# Patient Record
Sex: FEMALE | Race: WHITE | NOT HISPANIC OR LATINO | Employment: UNEMPLOYED | URBAN - METROPOLITAN AREA
[De-identification: names, ages, dates, MRNs, and addresses within clinical notes are randomized per-mention and may not be internally consistent; named-entity substitution may affect disease eponyms.]

---

## 2024-07-10 ENCOUNTER — TRANSCRIBE ORDERS (OUTPATIENT)
Dept: LAB | Facility: HOSPITAL | Age: 29
End: 2024-07-10
Payer: COMMERCIAL

## 2024-07-10 ENCOUNTER — LAB (OUTPATIENT)
Dept: LAB | Facility: HOSPITAL | Age: 29
End: 2024-07-10
Payer: COMMERCIAL

## 2024-07-10 DIAGNOSIS — Z34.82 PRENATAL CARE, SUBSEQUENT PREGNANCY, SECOND TRIMESTER: Primary | ICD-10-CM

## 2024-07-10 DIAGNOSIS — Z34.82 PRENATAL CARE, SUBSEQUENT PREGNANCY, SECOND TRIMESTER: ICD-10-CM

## 2024-07-10 LAB
AMPHET+METHAMPHET UR QL: NEGATIVE
AMPHETAMINES UR QL: NEGATIVE
BACTERIA UR QL AUTO: ABNORMAL /HPF
BARBITURATES UR QL SCN: NEGATIVE
BENZODIAZ UR QL SCN: NEGATIVE
BILIRUB UR QL STRIP: NEGATIVE
BUPRENORPHINE SERPL-MCNC: NEGATIVE NG/ML
CANNABINOIDS SERPL QL: NEGATIVE
CLARITY UR: ABNORMAL
COCAINE UR QL: NEGATIVE
COLOR UR: YELLOW
DEPRECATED RDW RBC AUTO: 45.2 FL (ref 37–54)
ERYTHROCYTE [DISTWIDTH] IN BLOOD BY AUTOMATED COUNT: 14.1 % (ref 12.3–15.4)
FENTANYL UR-MCNC: NEGATIVE NG/ML
GLUCOSE UR STRIP-MCNC: NEGATIVE MG/DL
HBV SURFACE AG SERPL QL IA: NORMAL
HCT VFR BLD AUTO: 33.5 % (ref 34–46.6)
HCV AB SER QL: NORMAL
HGB BLD-MCNC: 11.2 G/DL (ref 12–15.9)
HGB UR QL STRIP.AUTO: NEGATIVE
HIV 1+2 AB+HIV1 P24 AG SERPL QL IA: NORMAL
HOLD SPECIMEN: NORMAL
HYALINE CASTS UR QL AUTO: ABNORMAL /LPF
KETONES UR QL STRIP: ABNORMAL
LEUKOCYTE ESTERASE UR QL STRIP.AUTO: ABNORMAL
MCH RBC QN AUTO: 29.4 PG (ref 26.6–33)
MCHC RBC AUTO-ENTMCNC: 33.4 G/DL (ref 31.5–35.7)
MCV RBC AUTO: 87.9 FL (ref 79–97)
METHADONE UR QL SCN: NEGATIVE
NITRITE UR QL STRIP: NEGATIVE
OPIATES UR QL: NEGATIVE
OXYCODONE UR QL SCN: NEGATIVE
PCP UR QL SCN: NEGATIVE
PH UR STRIP.AUTO: 6 [PH] (ref 5–8)
PLATELET # BLD AUTO: 171 10*3/MM3 (ref 140–450)
PMV BLD AUTO: 10.8 FL (ref 6–12)
PROT UR QL STRIP: ABNORMAL
RBC # BLD AUTO: 3.81 10*6/MM3 (ref 3.77–5.28)
RBC # UR STRIP: ABNORMAL /HPF
REF LAB TEST METHOD: ABNORMAL
SP GR UR STRIP: 1.03 (ref 1–1.03)
SQUAMOUS #/AREA URNS HPF: ABNORMAL /HPF
TREPONEMA PALLIDUM IGG+IGM AB [PRESENCE] IN SERUM OR PLASMA BY IMMUNOASSAY: NORMAL
TRICYCLICS UR QL SCN: NEGATIVE
UROBILINOGEN UR QL STRIP: ABNORMAL
WBC # UR STRIP: ABNORMAL /HPF
WBC NRBC COR # BLD AUTO: 4.64 10*3/MM3 (ref 3.4–10.8)

## 2024-07-10 PROCEDURE — 86900 BLOOD TYPING SEROLOGIC ABO: CPT

## 2024-07-10 PROCEDURE — G0432 EIA HIV-1/HIV-2 SCREEN: HCPCS

## 2024-07-10 PROCEDURE — 36415 COLL VENOUS BLD VENIPUNCTURE: CPT

## 2024-07-10 PROCEDURE — 87086 URINE CULTURE/COLONY COUNT: CPT | Performed by: STUDENT IN AN ORGANIZED HEALTH CARE EDUCATION/TRAINING PROGRAM

## 2024-07-10 PROCEDURE — 86850 RBC ANTIBODY SCREEN: CPT

## 2024-07-10 PROCEDURE — 87340 HEPATITIS B SURFACE AG IA: CPT

## 2024-07-10 PROCEDURE — 85027 COMPLETE CBC AUTOMATED: CPT | Performed by: STUDENT IN AN ORGANIZED HEALTH CARE EDUCATION/TRAINING PROGRAM

## 2024-07-10 PROCEDURE — 81001 URINALYSIS AUTO W/SCOPE: CPT

## 2024-07-10 PROCEDURE — 87491 CHLMYD TRACH DNA AMP PROBE: CPT

## 2024-07-10 PROCEDURE — 80307 DRUG TEST PRSMV CHEM ANLYZR: CPT

## 2024-07-10 PROCEDURE — 86762 RUBELLA ANTIBODY: CPT

## 2024-07-10 PROCEDURE — 86787 VARICELLA-ZOSTER ANTIBODY: CPT

## 2024-07-10 PROCEDURE — 87591 N.GONORRHOEAE DNA AMP PROB: CPT

## 2024-07-10 PROCEDURE — 86901 BLOOD TYPING SEROLOGIC RH(D): CPT

## 2024-07-10 PROCEDURE — 86803 HEPATITIS C AB TEST: CPT

## 2024-07-10 PROCEDURE — 86780 TREPONEMA PALLIDUM: CPT

## 2024-07-10 PROCEDURE — 86870 RBC ANTIBODY IDENTIFICATION: CPT

## 2024-07-11 LAB
ABO GROUP BLD: NORMAL
BACTERIA SPEC AEROBE CULT: NORMAL
BLD GP AB SCN SERPL QL: POSITIVE
RESIDUAL RHIG DETECTED: NORMAL
RH BLD: NEGATIVE
RUBV IGG SERPL IA-ACNC: <0.9 INDEX
VZV IGG SER IA-ACNC: 790 INDEX

## 2024-07-14 LAB
C TRACH RRNA SPEC QL NAA+PROBE: NEGATIVE
N GONORRHOEA RRNA SPEC QL NAA+PROBE: NEGATIVE

## 2024-08-14 ENCOUNTER — TRANSCRIBE ORDERS (OUTPATIENT)
Dept: LAB | Facility: HOSPITAL | Age: 29
End: 2024-08-14
Payer: COMMERCIAL

## 2024-08-14 ENCOUNTER — LAB (OUTPATIENT)
Dept: LAB | Facility: HOSPITAL | Age: 29
End: 2024-08-14
Payer: COMMERCIAL

## 2024-08-14 DIAGNOSIS — Z3A.21 21 WEEKS GESTATION OF PREGNANCY: ICD-10-CM

## 2024-08-14 DIAGNOSIS — M54.50 LOW BACK PAIN, UNSPECIFIED BACK PAIN LATERALITY, UNSPECIFIED CHRONICITY, UNSPECIFIED WHETHER SCIATICA PRESENT: ICD-10-CM

## 2024-08-14 DIAGNOSIS — Z3A.21 21 WEEKS GESTATION OF PREGNANCY: Primary | ICD-10-CM

## 2024-08-14 LAB
BILIRUB UR QL STRIP: NEGATIVE
CLARITY UR: ABNORMAL
COLOR UR: YELLOW
GLUCOSE UR STRIP-MCNC: NEGATIVE MG/DL
HGB UR QL STRIP.AUTO: NEGATIVE
HOLD SPECIMEN: NORMAL
KETONES UR QL STRIP: NEGATIVE
LEUKOCYTE ESTERASE UR QL STRIP.AUTO: ABNORMAL
NITRITE UR QL STRIP: NEGATIVE
PH UR STRIP.AUTO: 6 [PH] (ref 5–8)
PROT UR QL STRIP: ABNORMAL
SP GR UR STRIP: 1.02 (ref 1–1.03)
UROBILINOGEN UR QL STRIP: ABNORMAL

## 2024-08-14 PROCEDURE — 81001 URINALYSIS AUTO W/SCOPE: CPT

## 2024-08-14 PROCEDURE — 87186 SC STD MICRODIL/AGAR DIL: CPT

## 2024-08-14 PROCEDURE — 87077 CULTURE AEROBIC IDENTIFY: CPT

## 2024-08-14 PROCEDURE — 87086 URINE CULTURE/COLONY COUNT: CPT

## 2024-08-15 LAB
BACTERIA UR QL AUTO: ABNORMAL /HPF
HYALINE CASTS UR QL AUTO: ABNORMAL /LPF
RBC # UR STRIP: ABNORMAL /HPF
REF LAB TEST METHOD: ABNORMAL
SQUAMOUS #/AREA URNS HPF: ABNORMAL /HPF
WBC # UR STRIP: ABNORMAL /HPF

## 2024-08-17 LAB — BACTERIA SPEC AEROBE CULT: ABNORMAL

## 2024-10-08 ENCOUNTER — TRANSCRIBE ORDERS (OUTPATIENT)
Dept: LAB | Facility: HOSPITAL | Age: 29
End: 2024-10-08
Payer: COMMERCIAL

## 2024-10-08 ENCOUNTER — LAB (OUTPATIENT)
Dept: LAB | Facility: HOSPITAL | Age: 29
End: 2024-10-08
Payer: COMMERCIAL

## 2024-10-08 DIAGNOSIS — Z34.83 PRENATAL CARE, SUBSEQUENT PREGNANCY, THIRD TRIMESTER: Primary | ICD-10-CM

## 2024-10-08 DIAGNOSIS — Z34.83 PRENATAL CARE, SUBSEQUENT PREGNANCY, THIRD TRIMESTER: ICD-10-CM

## 2024-10-08 LAB
BLD GP AB SCN SERPL QL: NEGATIVE
GLUCOSE 1H P 100 G GLC PO SERPL-MCNC: 86 MG/DL (ref 65–139)
TREPONEMA PALLIDUM IGG+IGM AB [PRESENCE] IN SERUM OR PLASMA BY IMMUNOASSAY: NORMAL

## 2024-10-08 PROCEDURE — 85027 COMPLETE CBC AUTOMATED: CPT

## 2024-10-08 PROCEDURE — 82950 GLUCOSE TEST: CPT

## 2024-10-08 PROCEDURE — 86850 RBC ANTIBODY SCREEN: CPT

## 2024-10-08 PROCEDURE — 86780 TREPONEMA PALLIDUM: CPT

## 2024-10-08 PROCEDURE — 82948 REAGENT STRIP/BLOOD GLUCOSE: CPT | Performed by: OBSTETRICS & GYNECOLOGY

## 2024-10-08 PROCEDURE — 36415 COLL VENOUS BLD VENIPUNCTURE: CPT

## 2024-10-09 LAB
DEPRECATED RDW RBC AUTO: 44.2 FL (ref 37–54)
ERYTHROCYTE [DISTWIDTH] IN BLOOD BY AUTOMATED COUNT: 13.1 % (ref 12.3–15.4)
HCT VFR BLD AUTO: 33.7 % (ref 34–46.6)
HGB BLD-MCNC: 10.8 G/DL (ref 12–15.9)
MCH RBC QN AUTO: 29.6 PG (ref 26.6–33)
MCHC RBC AUTO-ENTMCNC: 32 G/DL (ref 31.5–35.7)
MCV RBC AUTO: 92.3 FL (ref 79–97)
PLATELET # BLD AUTO: 145 10*3/MM3 (ref 140–450)
PMV BLD AUTO: 10.8 FL (ref 6–12)
RBC # BLD AUTO: 3.65 10*6/MM3 (ref 3.77–5.28)
WBC NRBC COR # BLD AUTO: 6.01 10*3/MM3 (ref 3.4–10.8)

## 2024-11-26 LAB — EXTERNAL GROUP B STREP ANTIGEN: NEGATIVE

## 2024-12-16 ENCOUNTER — PREP FOR SURGERY (OUTPATIENT)
Dept: OTHER | Facility: HOSPITAL | Age: 29
End: 2024-12-16
Payer: COMMERCIAL

## 2024-12-16 DIAGNOSIS — Z3A.39 39 WEEKS GESTATION OF PREGNANCY: Primary | ICD-10-CM

## 2024-12-16 RX ORDER — CARBOPROST TROMETHAMINE 250 UG/ML
250 INJECTION, SOLUTION INTRAMUSCULAR AS NEEDED
Status: CANCELLED | OUTPATIENT
Start: 2024-12-16

## 2024-12-16 RX ORDER — LIDOCAINE HYDROCHLORIDE 10 MG/ML
0.5 INJECTION, SOLUTION EPIDURAL; INFILTRATION; INTRACAUDAL; PERINEURAL ONCE AS NEEDED
Status: CANCELLED | OUTPATIENT
Start: 2024-12-16

## 2024-12-16 RX ORDER — MAGNESIUM CARB/ALUMINUM HYDROX 105-160MG
30 TABLET,CHEWABLE ORAL ONCE
Status: CANCELLED | OUTPATIENT
Start: 2024-12-16 | End: 2024-12-16

## 2024-12-16 RX ORDER — TERBUTALINE SULFATE 1 MG/ML
0.25 INJECTION, SOLUTION SUBCUTANEOUS AS NEEDED
Status: CANCELLED | OUTPATIENT
Start: 2024-12-16

## 2024-12-16 RX ORDER — ACETAMINOPHEN 325 MG/1
650 TABLET ORAL EVERY 4 HOURS PRN
Status: CANCELLED | OUTPATIENT
Start: 2024-12-16

## 2024-12-16 RX ORDER — ONDANSETRON 4 MG/1
4 TABLET, ORALLY DISINTEGRATING ORAL EVERY 6 HOURS PRN
Status: CANCELLED | OUTPATIENT
Start: 2024-12-16

## 2024-12-16 RX ORDER — BUTORPHANOL TARTRATE 2 MG/ML
2 INJECTION, SOLUTION INTRAMUSCULAR; INTRAVENOUS
Status: CANCELLED | OUTPATIENT
Start: 2024-12-16

## 2024-12-16 RX ORDER — SODIUM CHLORIDE, SODIUM LACTATE, POTASSIUM CHLORIDE, CALCIUM CHLORIDE 600; 310; 30; 20 MG/100ML; MG/100ML; MG/100ML; MG/100ML
125 INJECTION, SOLUTION INTRAVENOUS CONTINUOUS
Status: CANCELLED | OUTPATIENT
Start: 2024-12-16 | End: 2024-12-17

## 2024-12-16 RX ORDER — SODIUM CHLORIDE 0.9 % (FLUSH) 0.9 %
10 SYRINGE (ML) INJECTION EVERY 12 HOURS SCHEDULED
Status: CANCELLED | OUTPATIENT
Start: 2024-12-16

## 2024-12-16 RX ORDER — SODIUM CHLORIDE 9 MG/ML
40 INJECTION, SOLUTION INTRAVENOUS AS NEEDED
Status: CANCELLED | OUTPATIENT
Start: 2024-12-16

## 2024-12-16 RX ORDER — OXYTOCIN/0.9 % SODIUM CHLORIDE 30/500 ML
2-20 PLASTIC BAG, INJECTION (ML) INTRAVENOUS
Status: CANCELLED | OUTPATIENT
Start: 2024-12-16

## 2024-12-16 RX ORDER — BUTORPHANOL TARTRATE 1 MG/ML
1 INJECTION, SOLUTION INTRAMUSCULAR; INTRAVENOUS
Status: CANCELLED | OUTPATIENT
Start: 2024-12-16

## 2024-12-16 RX ORDER — METHYLERGONOVINE MALEATE 0.2 MG/ML
200 INJECTION INTRAVENOUS ONCE AS NEEDED
Status: CANCELLED | OUTPATIENT
Start: 2024-12-16

## 2024-12-16 RX ORDER — PROMETHAZINE HYDROCHLORIDE 12.5 MG/1
12.5 TABLET ORAL EVERY 6 HOURS PRN
Status: CANCELLED | OUTPATIENT
Start: 2024-12-16

## 2024-12-16 RX ORDER — SODIUM CHLORIDE 0.9 % (FLUSH) 0.9 %
10 SYRINGE (ML) INJECTION AS NEEDED
Status: CANCELLED | OUTPATIENT
Start: 2024-12-16

## 2024-12-16 RX ORDER — PROMETHAZINE HYDROCHLORIDE 12.5 MG/1
12.5 SUPPOSITORY RECTAL EVERY 6 HOURS PRN
Status: CANCELLED | OUTPATIENT
Start: 2024-12-16

## 2024-12-16 RX ORDER — OXYTOCIN/0.9 % SODIUM CHLORIDE 30/500 ML
30 PLASTIC BAG, INJECTION (ML) INTRAVENOUS CONTINUOUS
Status: CANCELLED | OUTPATIENT
Start: 2024-12-16 | End: 2024-12-16

## 2024-12-16 RX ORDER — MISOPROSTOL 200 UG/1
800 TABLET ORAL AS NEEDED
Status: CANCELLED | OUTPATIENT
Start: 2024-12-16

## 2024-12-16 RX ORDER — ONDANSETRON 2 MG/ML
4 INJECTION INTRAMUSCULAR; INTRAVENOUS EVERY 6 HOURS PRN
Status: CANCELLED | OUTPATIENT
Start: 2024-12-16

## 2024-12-16 RX ORDER — OXYTOCIN/0.9 % SODIUM CHLORIDE 30/500 ML
30 PLASTIC BAG, INJECTION (ML) INTRAVENOUS ONCE
Status: CANCELLED | OUTPATIENT
Start: 2024-12-16 | End: 2024-12-16

## 2024-12-18 ENCOUNTER — ANESTHESIA EVENT (OUTPATIENT)
Dept: LABOR AND DELIVERY | Facility: HOSPITAL | Age: 29
End: 2024-12-18
Payer: COMMERCIAL

## 2024-12-18 ENCOUNTER — ANESTHESIA (OUTPATIENT)
Dept: LABOR AND DELIVERY | Facility: HOSPITAL | Age: 29
End: 2024-12-18
Payer: COMMERCIAL

## 2024-12-18 ENCOUNTER — HOSPITAL ENCOUNTER (INPATIENT)
Facility: HOSPITAL | Age: 29
LOS: 2 days | Discharge: HOME | End: 2024-12-20
Attending: OBSTETRICS & GYNECOLOGY | Admitting: STUDENT IN AN ORGANIZED HEALTH CARE EDUCATION/TRAINING PROGRAM
Payer: COMMERCIAL

## 2024-12-18 ENCOUNTER — HOSPITAL ENCOUNTER (OUTPATIENT)
Dept: LABOR AND DELIVERY | Facility: HOSPITAL | Age: 29
Discharge: HOME | End: 2024-12-18
Payer: COMMERCIAL

## 2024-12-18 PROBLEM — Z34.90 TERM PREGNANCY: Status: ACTIVE | Noted: 2024-12-18

## 2024-12-18 LAB
ABO GROUP BLD: NORMAL
BLD GP AB SCN SERPL QL: POSITIVE
DEPRECATED RDW RBC AUTO: 47.8 FL (ref 37–54)
ERYTHROCYTE [DISTWIDTH] IN BLOOD BY AUTOMATED COUNT: 14.8 % (ref 12.3–15.4)
HCT VFR BLD AUTO: 37.1 % (ref 34–46.6)
HGB BLD-MCNC: 12.7 G/DL (ref 12–15.9)
MCH RBC QN AUTO: 30.5 PG (ref 26.6–33)
MCHC RBC AUTO-ENTMCNC: 34.2 G/DL (ref 31.5–35.7)
MCV RBC AUTO: 89.2 FL (ref 79–97)
PLATELET # BLD AUTO: 138 10*3/MM3 (ref 140–450)
PMV BLD AUTO: 10.3 FL (ref 6–12)
RBC # BLD AUTO: 4.16 10*6/MM3 (ref 3.77–5.28)
RESIDUAL RHIG DETECTED: NORMAL
RH BLD: NEGATIVE
T&S EXPIRATION DATE: NORMAL
TREPONEMA PALLIDUM IGG+IGM AB [PRESENCE] IN SERUM OR PLASMA BY IMMUNOASSAY: NORMAL
WBC NRBC COR # BLD AUTO: 5.65 10*3/MM3 (ref 3.4–10.8)

## 2024-12-18 PROCEDURE — 86901 BLOOD TYPING SEROLOGIC RH(D): CPT | Performed by: STUDENT IN AN ORGANIZED HEALTH CARE EDUCATION/TRAINING PROGRAM

## 2024-12-18 PROCEDURE — 25010000002 LIDOCAINE-EPINEPHRINE (PF) 1.5 %-1:200000 SOLUTION: Performed by: NURSE ANESTHETIST, CERTIFIED REGISTERED

## 2024-12-18 PROCEDURE — 3E033VJ INTRODUCTION OF OTHER HORMONE INTO PERIPHERAL VEIN, PERCUTANEOUS APPROACH: ICD-10-PCS | Performed by: OBSTETRICS & GYNECOLOGY

## 2024-12-18 PROCEDURE — 25010000002 FENTANYL CITRATE (PF) 50 MCG/ML SOLUTION: Performed by: NURSE ANESTHETIST, CERTIFIED REGISTERED

## 2024-12-18 PROCEDURE — 25810000003 LACTATED RINGERS PER 1000 ML: Performed by: STUDENT IN AN ORGANIZED HEALTH CARE EDUCATION/TRAINING PROGRAM

## 2024-12-18 PROCEDURE — 86870 RBC ANTIBODY IDENTIFICATION: CPT | Performed by: STUDENT IN AN ORGANIZED HEALTH CARE EDUCATION/TRAINING PROGRAM

## 2024-12-18 PROCEDURE — 25010000002 ROPIVACAINE PER 1 MG: Performed by: NURSE ANESTHETIST, CERTIFIED REGISTERED

## 2024-12-18 PROCEDURE — 51703 INSERT BLADDER CATH COMPLEX: CPT

## 2024-12-18 PROCEDURE — 25010000002 METHYLERGONOVINE MALEATE PER 0.2 MG: Performed by: OBSTETRICS & GYNECOLOGY

## 2024-12-18 PROCEDURE — 86850 RBC ANTIBODY SCREEN: CPT | Performed by: STUDENT IN AN ORGANIZED HEALTH CARE EDUCATION/TRAINING PROGRAM

## 2024-12-18 PROCEDURE — 59025 FETAL NON-STRESS TEST: CPT

## 2024-12-18 PROCEDURE — 10907ZC DRAINAGE OF AMNIOTIC FLUID, THERAPEUTIC FROM PRODUCTS OF CONCEPTION, VIA NATURAL OR ARTIFICIAL OPENING: ICD-10-PCS | Performed by: OBSTETRICS & GYNECOLOGY

## 2024-12-18 PROCEDURE — 86780 TREPONEMA PALLIDUM: CPT | Performed by: STUDENT IN AN ORGANIZED HEALTH CARE EDUCATION/TRAINING PROGRAM

## 2024-12-18 PROCEDURE — C1755 CATHETER, INTRASPINAL: HCPCS | Performed by: ANESTHESIOLOGY

## 2024-12-18 PROCEDURE — 85027 COMPLETE CBC AUTOMATED: CPT | Performed by: STUDENT IN AN ORGANIZED HEALTH CARE EDUCATION/TRAINING PROGRAM

## 2024-12-18 PROCEDURE — 86900 BLOOD TYPING SEROLOGIC ABO: CPT | Performed by: STUDENT IN AN ORGANIZED HEALTH CARE EDUCATION/TRAINING PROGRAM

## 2024-12-18 PROCEDURE — C1755 CATHETER, INTRASPINAL: HCPCS

## 2024-12-18 RX ORDER — LIDOCAINE HYDROCHLORIDE 10 MG/ML
0.5 INJECTION, SOLUTION EPIDURAL; INFILTRATION; INTRACAUDAL; PERINEURAL ONCE AS NEEDED
Status: DISCONTINUED | OUTPATIENT
Start: 2024-12-18 | End: 2024-12-18

## 2024-12-18 RX ORDER — MAGNESIUM CARB/ALUMINUM HYDROX 105-160MG
30 TABLET,CHEWABLE ORAL ONCE
Status: DISCONTINUED | OUTPATIENT
Start: 2024-12-18 | End: 2024-12-18

## 2024-12-18 RX ORDER — BUTORPHANOL TARTRATE 2 MG/ML
2 INJECTION, SOLUTION INTRAMUSCULAR; INTRAVENOUS
Status: DISCONTINUED | OUTPATIENT
Start: 2024-12-18 | End: 2024-12-18

## 2024-12-18 RX ORDER — SIMETHICONE 80 MG
80 TABLET,CHEWABLE ORAL 4 TIMES DAILY PRN
Status: DISCONTINUED | OUTPATIENT
Start: 2024-12-18 | End: 2024-12-20 | Stop reason: HOSPADM

## 2024-12-18 RX ORDER — ONDANSETRON 2 MG/ML
4 INJECTION INTRAMUSCULAR; INTRAVENOUS ONCE AS NEEDED
Status: DISCONTINUED | OUTPATIENT
Start: 2024-12-18 | End: 2024-12-18

## 2024-12-18 RX ORDER — PROMETHAZINE HYDROCHLORIDE 12.5 MG/1
12.5 TABLET ORAL EVERY 6 HOURS PRN
Status: DISCONTINUED | OUTPATIENT
Start: 2024-12-18 | End: 2024-12-18 | Stop reason: HOSPADM

## 2024-12-18 RX ORDER — METHYLERGONOVINE MALEATE 0.2 MG/ML
200 INJECTION INTRAVENOUS ONCE AS NEEDED
Status: COMPLETED | OUTPATIENT
Start: 2024-12-18 | End: 2024-12-18

## 2024-12-18 RX ORDER — HYDROCODONE BITARTRATE AND ACETAMINOPHEN 5; 325 MG/1; MG/1
1 TABLET ORAL EVERY 4 HOURS PRN
Status: DISCONTINUED | OUTPATIENT
Start: 2024-12-18 | End: 2024-12-20 | Stop reason: HOSPADM

## 2024-12-18 RX ORDER — ROPIVACAINE HYDROCHLORIDE 2 MG/ML
13 INJECTION, SOLUTION EPIDURAL; INFILTRATION; PERINEURAL CONTINUOUS
Status: DISCONTINUED | OUTPATIENT
Start: 2024-12-18 | End: 2024-12-18

## 2024-12-18 RX ORDER — HYDROCORTISONE 25 MG/G
1 CREAM TOPICAL AS NEEDED
Status: DISCONTINUED | OUTPATIENT
Start: 2024-12-18 | End: 2024-12-20 | Stop reason: HOSPADM

## 2024-12-18 RX ORDER — DOCUSATE SODIUM 100 MG/1
100 CAPSULE, LIQUID FILLED ORAL 2 TIMES DAILY
Status: DISCONTINUED | OUTPATIENT
Start: 2024-12-18 | End: 2024-12-20 | Stop reason: HOSPADM

## 2024-12-18 RX ORDER — CITRIC ACID/SODIUM CITRATE 334-500MG
30 SOLUTION, ORAL ORAL ONCE
Status: DISCONTINUED | OUTPATIENT
Start: 2024-12-18 | End: 2024-12-18

## 2024-12-18 RX ORDER — LIDOCAINE HYDROCHLORIDE AND EPINEPHRINE 15; 5 MG/ML; UG/ML
INJECTION, SOLUTION EPIDURAL AS NEEDED
Status: DISCONTINUED | OUTPATIENT
Start: 2024-12-18 | End: 2024-12-18 | Stop reason: SURG

## 2024-12-18 RX ORDER — OXYTOCIN/0.9 % SODIUM CHLORIDE 30/500 ML
30 PLASTIC BAG, INJECTION (ML) INTRAVENOUS ONCE
Status: DISCONTINUED | OUTPATIENT
Start: 2024-12-18 | End: 2024-12-20 | Stop reason: HOSPADM

## 2024-12-18 RX ORDER — FAMOTIDINE 10 MG/ML
20 INJECTION, SOLUTION INTRAVENOUS ONCE AS NEEDED
Status: DISCONTINUED | OUTPATIENT
Start: 2024-12-18 | End: 2024-12-18 | Stop reason: HOSPADM

## 2024-12-18 RX ORDER — IBUPROFEN 600 MG/1
600 TABLET, FILM COATED ORAL EVERY 6 HOURS PRN
Status: DISCONTINUED | OUTPATIENT
Start: 2024-12-18 | End: 2024-12-20 | Stop reason: HOSPADM

## 2024-12-18 RX ORDER — PRENATAL WITH FERROUS FUM AND FOLIC ACID 3080; 920; 120; 400; 22; 1.84; 3; 20; 10; 1; 12; 200; 27; 25; 2 [IU]/1; [IU]/1; MG/1; [IU]/1; MG/1; MG/1; MG/1; MG/1; MG/1; MG/1; UG/1; MG/1; MG/1; MG/1; MG/1
1 TABLET ORAL DAILY
COMMUNITY

## 2024-12-18 RX ORDER — METHYLERGONOVINE MALEATE 0.2 MG/ML
200 INJECTION INTRAVENOUS ONCE AS NEEDED
Status: DISCONTINUED | OUTPATIENT
Start: 2024-12-18 | End: 2024-12-18 | Stop reason: HOSPADM

## 2024-12-18 RX ORDER — PROMETHAZINE HYDROCHLORIDE 12.5 MG/1
12.5 TABLET ORAL EVERY 4 HOURS PRN
Status: DISCONTINUED | OUTPATIENT
Start: 2024-12-18 | End: 2024-12-20 | Stop reason: HOSPADM

## 2024-12-18 RX ORDER — CALCIUM CARBONATE 500 MG/1
1 TABLET, CHEWABLE ORAL 3 TIMES DAILY PRN
Status: DISCONTINUED | OUTPATIENT
Start: 2024-12-18 | End: 2024-12-20 | Stop reason: HOSPADM

## 2024-12-18 RX ORDER — DIPHENHYDRAMINE HYDROCHLORIDE 50 MG/ML
12.5 INJECTION INTRAMUSCULAR; INTRAVENOUS EVERY 8 HOURS PRN
Status: DISCONTINUED | OUTPATIENT
Start: 2024-12-18 | End: 2024-12-18

## 2024-12-18 RX ORDER — MISOPROSTOL 200 UG/1
800 TABLET ORAL AS NEEDED
Status: DISCONTINUED | OUTPATIENT
Start: 2024-12-18 | End: 2024-12-18 | Stop reason: HOSPADM

## 2024-12-18 RX ORDER — ONDANSETRON 4 MG/1
4 TABLET, ORALLY DISINTEGRATING ORAL EVERY 8 HOURS PRN
Status: DISCONTINUED | OUTPATIENT
Start: 2024-12-18 | End: 2024-12-20 | Stop reason: HOSPADM

## 2024-12-18 RX ORDER — ONDANSETRON 2 MG/ML
4 INJECTION INTRAMUSCULAR; INTRAVENOUS EVERY 6 HOURS PRN
Status: DISCONTINUED | OUTPATIENT
Start: 2024-12-18 | End: 2024-12-18 | Stop reason: HOSPADM

## 2024-12-18 RX ORDER — CARBOPROST TROMETHAMINE 250 UG/ML
250 INJECTION, SOLUTION INTRAMUSCULAR AS NEEDED
Status: DISCONTINUED | OUTPATIENT
Start: 2024-12-18 | End: 2024-12-20 | Stop reason: HOSPADM

## 2024-12-18 RX ORDER — MISOPROSTOL 200 UG/1
800 TABLET ORAL AS NEEDED
Status: DISCONTINUED | OUTPATIENT
Start: 2024-12-18 | End: 2024-12-20 | Stop reason: HOSPADM

## 2024-12-18 RX ORDER — PROMETHAZINE HYDROCHLORIDE 12.5 MG/1
12.5 SUPPOSITORY RECTAL EVERY 6 HOURS PRN
Status: DISCONTINUED | OUTPATIENT
Start: 2024-12-18 | End: 2024-12-18 | Stop reason: HOSPADM

## 2024-12-18 RX ORDER — SODIUM CHLORIDE 0.9 % (FLUSH) 0.9 %
1-10 SYRINGE (ML) INJECTION AS NEEDED
Status: DISCONTINUED | OUTPATIENT
Start: 2024-12-18 | End: 2024-12-20 | Stop reason: HOSPADM

## 2024-12-18 RX ORDER — SODIUM CHLORIDE 0.9 % (FLUSH) 0.9 %
10 SYRINGE (ML) INJECTION EVERY 12 HOURS SCHEDULED
Status: DISCONTINUED | OUTPATIENT
Start: 2024-12-18 | End: 2024-12-18

## 2024-12-18 RX ORDER — TERBUTALINE SULFATE 1 MG/ML
0.25 INJECTION, SOLUTION SUBCUTANEOUS AS NEEDED
Status: DISCONTINUED | OUTPATIENT
Start: 2024-12-18 | End: 2024-12-18

## 2024-12-18 RX ORDER — SODIUM CHLORIDE 0.9 % (FLUSH) 0.9 %
10 SYRINGE (ML) INJECTION AS NEEDED
Status: DISCONTINUED | OUTPATIENT
Start: 2024-12-18 | End: 2024-12-18

## 2024-12-18 RX ORDER — HYDROCODONE BITARTRATE AND ACETAMINOPHEN 10; 325 MG/1; MG/1
1 TABLET ORAL EVERY 4 HOURS PRN
Status: DISCONTINUED | OUTPATIENT
Start: 2024-12-18 | End: 2024-12-20 | Stop reason: HOSPADM

## 2024-12-18 RX ORDER — ACETAMINOPHEN 325 MG/1
650 TABLET ORAL EVERY 6 HOURS PRN
Status: DISCONTINUED | OUTPATIENT
Start: 2024-12-18 | End: 2024-12-20 | Stop reason: HOSPADM

## 2024-12-18 RX ORDER — OXYTOCIN/0.9 % SODIUM CHLORIDE 30/500 ML
125 PLASTIC BAG, INJECTION (ML) INTRAVENOUS ONCE AS NEEDED
Status: DISCONTINUED | OUTPATIENT
Start: 2024-12-18 | End: 2024-12-20 | Stop reason: HOSPADM

## 2024-12-18 RX ORDER — CARBOPROST TROMETHAMINE 250 UG/ML
250 INJECTION, SOLUTION INTRAMUSCULAR AS NEEDED
Status: DISCONTINUED | OUTPATIENT
Start: 2024-12-18 | End: 2024-12-18 | Stop reason: HOSPADM

## 2024-12-18 RX ORDER — OXYTOCIN/0.9 % SODIUM CHLORIDE 30/500 ML
30 PLASTIC BAG, INJECTION (ML) INTRAVENOUS CONTINUOUS
Status: ACTIVE | OUTPATIENT
Start: 2024-12-18 | End: 2024-12-18

## 2024-12-18 RX ORDER — SODIUM CHLORIDE 9 MG/ML
40 INJECTION, SOLUTION INTRAVENOUS AS NEEDED
Status: DISCONTINUED | OUTPATIENT
Start: 2024-12-18 | End: 2024-12-18

## 2024-12-18 RX ORDER — SODIUM CHLORIDE, SODIUM LACTATE, POTASSIUM CHLORIDE, CALCIUM CHLORIDE 600; 310; 30; 20 MG/100ML; MG/100ML; MG/100ML; MG/100ML
125 INJECTION, SOLUTION INTRAVENOUS CONTINUOUS
Status: DISCONTINUED | OUTPATIENT
Start: 2024-12-18 | End: 2024-12-18

## 2024-12-18 RX ORDER — EPHEDRINE SULFATE 5 MG/ML
10 INJECTION INTRAVENOUS
Status: DISCONTINUED | OUTPATIENT
Start: 2024-12-18 | End: 2024-12-18

## 2024-12-18 RX ORDER — PRENATAL VIT/IRON FUM/FOLIC AC 27MG-0.8MG
1 TABLET ORAL DAILY
Status: DISCONTINUED | OUTPATIENT
Start: 2024-12-18 | End: 2024-12-20 | Stop reason: HOSPADM

## 2024-12-18 RX ORDER — ONDANSETRON 4 MG/1
4 TABLET, ORALLY DISINTEGRATING ORAL EVERY 6 HOURS PRN
Status: DISCONTINUED | OUTPATIENT
Start: 2024-12-18 | End: 2024-12-18 | Stop reason: HOSPADM

## 2024-12-18 RX ORDER — FENTANYL CITRATE 50 UG/ML
INJECTION, SOLUTION INTRAMUSCULAR; INTRAVENOUS AS NEEDED
Status: DISCONTINUED | OUTPATIENT
Start: 2024-12-18 | End: 2024-12-18 | Stop reason: SURG

## 2024-12-18 RX ORDER — BUTORPHANOL TARTRATE 1 MG/ML
1 INJECTION, SOLUTION INTRAMUSCULAR; INTRAVENOUS
Status: DISCONTINUED | OUTPATIENT
Start: 2024-12-18 | End: 2024-12-18

## 2024-12-18 RX ORDER — DIPHENHYDRAMINE HCL 25 MG
25 CAPSULE ORAL NIGHTLY PRN
Status: DISCONTINUED | OUTPATIENT
Start: 2024-12-18 | End: 2024-12-20 | Stop reason: HOSPADM

## 2024-12-18 RX ORDER — OXYTOCIN/0.9 % SODIUM CHLORIDE 30/500 ML
2-20 PLASTIC BAG, INJECTION (ML) INTRAVENOUS
Status: DISCONTINUED | OUTPATIENT
Start: 2024-12-18 | End: 2024-12-18

## 2024-12-18 RX ORDER — ONDANSETRON 2 MG/ML
4 INJECTION INTRAMUSCULAR; INTRAVENOUS EVERY 6 HOURS PRN
Status: DISCONTINUED | OUTPATIENT
Start: 2024-12-18 | End: 2024-12-20 | Stop reason: HOSPADM

## 2024-12-18 RX ORDER — ACETAMINOPHEN 325 MG/1
650 TABLET ORAL EVERY 4 HOURS PRN
Status: DISCONTINUED | OUTPATIENT
Start: 2024-12-18 | End: 2024-12-18 | Stop reason: HOSPADM

## 2024-12-18 RX ADMIN — IBUPROFEN 600 MG: 600 TABLET, FILM COATED ORAL at 22:00

## 2024-12-18 RX ADMIN — ROPIVACAINE HYDROCHLORIDE 8 ML: 5 INJECTION, SOLUTION EPIDURAL; INFILTRATION; PERINEURAL at 11:31

## 2024-12-18 RX ADMIN — FENTANYL CITRATE 100 MCG: 50 INJECTION, SOLUTION INTRAMUSCULAR; INTRAVENOUS at 11:30

## 2024-12-18 RX ADMIN — METHYLERGONOVINE MALEATE 200 MCG: 0.2 INJECTION INTRAVENOUS at 17:20

## 2024-12-18 RX ADMIN — DOCUSATE SODIUM 100 MG: 100 CAPSULE, LIQUID FILLED ORAL at 22:00

## 2024-12-18 RX ADMIN — ROPIVACAINE HYDROCHLORIDE 13 ML/HR: 2 INJECTION, SOLUTION EPIDURAL; INFILTRATION at 11:33

## 2024-12-18 RX ADMIN — SODIUM CHLORIDE, POTASSIUM CHLORIDE, SODIUM LACTATE AND CALCIUM CHLORIDE 125 ML/HR: 600; 310; 30; 20 INJECTION, SOLUTION INTRAVENOUS at 06:40

## 2024-12-18 RX ADMIN — Medication 2 MILLI-UNITS/MIN: at 08:04

## 2024-12-18 RX ADMIN — LIDOCAINE HYDROCHLORIDE AND EPINEPHRINE 3 ML: 15; 5 INJECTION, SOLUTION EPIDURAL at 11:28

## 2024-12-18 NOTE — PAYOR COMM NOTE
"Richa Morris (29 y.o. Female) [id number]      Date of Birth   1995    Social Security Number       Address   [address] [facility]    Home Phone   [phone]    MRN   [id number]       Sabianism   Alevism    Marital Status                               Admission Date   24    Admission Type   Elective    Admitting Provider   Zabrina Hardwick DO    Attending Provider   Zabrina Hardwick DO    Department, Room/Bed   [facility] MOTHER BABY 4B, N430/1       Discharge Date       Discharge Disposition       Discharge Destination                                 Attending Provider: Zabrina Hardwick DO    Allergies: Penicillins    Isolation: None   Infection: None   Code Status: CPR    Ht: 154.9 cm (61\")   Wt: 76.2 kg (168 lb)    Admission Cmt: None   Principal Problem: Term pregnancy [Z34.90]                   Active Insurance as of 2024       Primary Coverage       Payor Plan Insurance Group Employer/Plan Group    [employer] [place] [employer] [place]        Payor Plan Address Payor Plan Phone Number Payor Plan Fax Number Effective Dates    [address] [address] [phone]  2016 - None Entered    [facility] [id number]         Subscriber Name Subscriber Birth Date Member ID       RICHA MORRIS 1995 [id number]                     Emergency Contacts        (Rel.) Home Phone Work Phone Mobile Phone    Prerna Hallman (Grandparent) [phone] -- --              Insurance Information                  [facility]/WELLCARE MEDICAID Phone: [phone]    Subscriber: Richa Morris Subscriber#: [id number]    Group#: -- Precert#: --    Authorization#: -- Effective Date: --             History & Physical        Marcelina Rosales MD at 24 0854          [facility]  Obstetric History and Physical    CC: IOL    SUBJECTIVE:     Patient is a 29 y.o. female  currently at 39w3d, who presents with uncomplicated " pregnancy for IOL.      Prenatal Information:  Prenatal Results       Initial Prenatal Labs       Test Value Reference Range Date Time    Hemoglobin  11.2 g/dL 12.0 - 15.9 07/10/24 0939    Hematocrit  33.5 % 34.0 - 46.6 07/10/24 0939    Platelets  171 10*3/mm3 140 - 450 07/10/24 0939    Rubella IgG  <0.90 index Immune >0.99 07/10/24 0939    Hepatitis B SAg  Non-Reactive  Non-Reactive 07/10/24 0939    Hepatitis C Ab  Non-Reactive  Non-Reactive 07/10/24 0939    RPR        T. Pallidum Ab   Non-Reactive  Non-Reactive 10/08/24 1721       Non-Reactive  Non-Reactive 07/10/24 0939    ABO  A   12/18/24 0634    Rh  Negative   12/18/24 0634    Antibody Screen  Positive   07/10/24 0939    HIV  Non-Reactive  Non-Reactive 07/10/24 0939    Urine Culture  >100,000 CFU/mL Escherichia coli   08/14/24 1208       >100,000 CFU/mL Normal Urogenital Guera   07/10/24 0939    Gonorrhea  Negative  Negative 07/10/24 0939    Chlamydia  Negative  Negative 07/10/24 0939    TSH        HgB A1c         Varicella IgG  790 index Immune >165 07/10/24 0939    Hemoglobinopathy Fractionation        Hemoglobinopathy (genetic testing)        Cystic fibrosis         Spinal muscular atrophy        Fragile X                  Fetal testing        Test Value Reference Range Date Time    NIPT        MSAFP        AFP-4                  2nd and 3rd Trimester       Test Value Reference Range Date Time    Hemoglobin (repeated)  12.7 g/dL 12.0 - 15.9 12/18/24 0634       10.8 g/dL 12.0 - 15.9 10/08/24 1721    Hematocrit (repeated)  37.1 % 34.0 - 46.6 12/18/24 0634       33.7 % 34.0 - 46.6 10/08/24 1721    Platelets   138 10*3/mm3 140 - 450 12/18/24 0634       145 10*3/mm3 140 - 450 10/08/24 1721       171 10*3/mm3 140 - 450 07/10/24 0939    1 hour GTT   86 mg/dL 65 - 139 10/08/24 1721    Antibody Screen (repeated)  Positive   12/18/24 0634       Negative   10/08/24 1721    3rd TM syphilis scrn (repeated)  RPR         3rd TM syphilis scrn (repeated) TP-Ab  Non-Reactive   Non-Reactive 10/08/24 1721    3rd TM syphilis screen TB-Ab (FTA)  Non-Reactive  Non-Reactive 10/08/24 1721    Syphilis cascade test TP-Ab (EIA)        Syphilis cascade TPPA        GTT Fasting        GTT 1 Hr        GTT 2 Hr        GTT 3 Hr        Group B Strep ^ Negative   11/26/24               Other testing        Test Value Reference Range Date Time    Parvo IgG         CMV IgG                   Drug Screening       Test Value Reference Range Date Time    Amphetamine Screen  Negative  Negative 07/10/24 0939    Barbiturate Screen  Negative  Negative 07/10/24 0939    Benzodiazepine Screen  Negative  Negative 07/10/24 0939    Methadone Screen  Negative  Negative 07/10/24 0939    Phencyclidine Screen  Negative  Negative 07/10/24 0939    Opiates Screen  Negative  Negative 07/10/24 0939    THC Screen  Negative  Negative 07/10/24 0939    Cocaine Screen  Negative  Negative 07/10/24 0939    Propoxyphene Screen        Buprenorphine Screen  Negative  Negative 07/10/24 0939    Methamphetamine Screen  Negative  Negative 07/10/24 0939    Oxycodone Screen  Negative  Negative 07/10/24 0939    Tricyclic Antidepressants Screen  Negative  Negative 07/10/24 0939              Legend    ^: Historical                          External Prenatal Results       Pregnancy Outside Results - Transcribed From Office Records - See Scanned Records For Details       Test Value Date Time    ABO  A  12/18/24 0634    Rh  Negative  12/18/24 0634    Antibody Screen  Positive  12/18/24 0634       Negative  10/08/24 1721       Positive  07/10/24 0939    Varicella IgG  790 index 07/10/24 0939    Rubella  <0.90 index 07/10/24 0939    Hgb  12.7 g/dL 12/18/24 0634       10.8 g/dL 10/08/24 1721       11.2 g/dL 07/10/24 0939    Hct  37.1 % 12/18/24 0634       33.7 % 10/08/24 1721       33.5 % 07/10/24 0939    HgB A1c        1h GTT  86 mg/dL 10/08/24 1721    3h GTT Fasting       3h GTT 1 hour       3h GTT 2 hour       3h GTT 3 hour        Gonorrhea (discrete)   Negative  07/10/24 09    Chlamydia (discrete)  Negative  07/10/24 0939    RPR       Syphils cascade: TP-Ab (FTA)  Non-Reactive  10/08/24 1721       Non-Reactive  07/10/24 0939    TP-Ab  Non-Reactive  10/08/24 1721       Non-Reactive  07/10/24 0939    TP-Ab (EIA)       TPPA       HBsAg  Non-Reactive  07/10/24 0939    Herpes Simplex Virus PCR       Herpes Simplex VIrus Culture       HIV  Non-Reactive  07/10/24 09    Hep C RNA Quant PCR       Hep C Antibody  Non-Reactive  07/10/24 0939    AFP       NIPT       Cystic Fibrosis (Juan)       Cystic Fibroisis        Spinal Muscular atrophy       Fragile X       Group B Strep ^ Negative  24     GBS Susceptibility to Clindamycin       GBS Susceptibility to Erythromycin       Fetal Fibronectin       Genetic Testing, Maternal Blood                 Drug Screening       Test Value Date Time    Urine Drug Screen       Amphetamine Screen  Negative  07/10/24 09    Barbiturate Screen  Negative  07/10/24 09    Benzodiazepine Screen  Negative  07/10/24 09    Methadone Screen  Negative  07/10/24 0939    Phencyclidine Screen  Negative  07/10/24 0939    Opiates Screen  Negative  07/10/24 0939    THC Screen  Negative  07/10/24 0939    Cocaine Screen       Propoxyphene Screen       Buprenorphine Screen  Negative  07/10/24 09    Methamphetamine Screen       Oxycodone Screen  Negative  07/10/24 09    Tricyclic Antidepressants Screen  Negative  07/10/24 0939              Legend    ^: Historical                             OB History:                     OB History    Para Term  AB Living   4 2 2 0 1 2   SAB IAB Ectopic Molar Multiple Live Births   1 0 0 0 0 2      # Outcome Date GA Lbr Del/2nd Weight Sex Type Anes PTL Lv   4 Current            3 Term 22 39w2d  3317 g (7 lb 5 oz) F Vag-Spont None  MARLON      Complications: Precipitant delivery   2 SAB  8w0d    SAB      1 Term 10/17/19 40w0d  3572 g (7 lb 14 oz) F Vag-Spont EPI  MARLON      Allergies:                         Allergies   Allergen Reactions    Penicillins       Past Medical History: History reviewed. No pertinent past medical history.   Past Surgical History Past Surgical History:   Procedure Laterality Date    UMBILICAL HERNIA REPAIR  2006      Family History: Family History   Problem Relation Age of Onset    Breast cancer Maternal Aunt       Social History:  reports that she has never smoked. She has never used smokeless tobacco.   reports no history of alcohol use.   reports no history of drug use.    General ROS: Pertinent items are noted in HPI, all other systems reviewed and negative   Medications: Prenatal 27-1, cetirizine, ketotifen, methylphenidate, and oxybutynin XL       Objective      Vital Signs Range for the last 24 hours  Temperature: Temp:  [97.5 °F (36.4 °C)-98.1 °F (36.7 °C)] 98.1 °F (36.7 °C)   BP: BP: (107-121)/(65-73) 107/65   Pulse: Heart Rate:  [83-91] 83   Respirations: Resp:  [16] 16   SPO2:                 Physical Examination: General appearance - alert, well appearing, and in no distress  Chest - clear to auscultation, no wheezes, rales or rhonchi, symmetric air entry  Heart - normal rate, regular rhythm, normal S1, S2, no murmurs, rubs, clicks or gallops  Abdomen - Soft, gravid, nontender  Extremities - pedal edema tr +      Presentation: VTX   Cervix: Exam by: Method: sterile vaginal exam performed   Dilation: Cervical Dilation (cm): 4   Effacement: Cervical Effacement: 60   Station: Fetal Station: 1-->-2   AROM clear fluid    Fetal Heart Rate Assessment           Baseline: Fetal HR Baseline: normal range   Variability: Fetal HR Variability: moderate (amplitude range 6 to 25 bpm)   Accels: Fetal HR Accelerations: greater than/equal to 15 bpm, lasting at least 15 seconds   Decels: Fetal HR Decelerations: absent   Tracing Category:       Uterine Assessment   Method: Method: external tocotransducer   Frequency (min): Contraction Frequency (Minutes): poor tracing   Ctx Count  in 10 min: Contractions in 10 Minutes: 1     Laboratory Results:  CBC:      Lab 12/18/24  0634   WBC 5.65   HEMOGLOBIN 12.7   HEMATOCRIT 37.1   PLATELETS 138*   MCV 89.2              Assessment/Plan:   IUP 39w3d for IOL      1.IOL  -PPP    2.GBS neg    3.FWB reasssuring      Marcelina Rosales MD  12/18/2024  08:54 EST      Electronically signed by Marcelina Rosales MD at 12/18/24 0855       Physician Progress Notes (last 48 hours)  Notes from 12/16/24 1521 through 12/18/24 1521   No notes of this type exist for this encounter.

## 2024-12-18 NOTE — ANESTHESIA PREPROCEDURE EVALUATION
Anesthesia Evaluation     Patient summary reviewed and Nursing notes reviewed   NPO Solid Status: > 6 hours  NPO Liquid Status: > 6 hours           Airway   Dental      Pulmonary - negative pulmonary ROS   Cardiovascular - negative cardio ROS        Neuro/Psych  (+) psychiatric history ADHD  GI/Hepatic/Renal/Endo    (+) obesity    Musculoskeletal (-) negative ROS    Abdominal    Substance History - negative use     OB/GYN    (+) Pregnant        Other                    Anesthesia Plan    ASA 2     epidural       Anesthetic plan, risks, benefits, and alternatives have been provided, discussed and informed consent has been obtained with: patient.    CODE STATUS:    Level Of Support Discussed With: Patient  Code Status (Patient has no pulse and is not breathing): CPR (Attempt to Resuscitate)  Medical Interventions (Patient has pulse or is breathing): Full Support

## 2024-12-18 NOTE — ANESTHESIA PROCEDURE NOTES
Labor Epidural      Patient reassessed immediately prior to procedure    Patient location during procedure: OB  Performed By  CRNA/CAA: Joanie Bravo CRNA  Preanesthetic Checklist  Completed: patient identified, IV checked, risks and benefits discussed, surgical consent, monitors and equipment checked, pre-op evaluation and timeout performed  Prep:  Pt Position:sitting  Sterile Tech:cap, gloves, mask and sterile barrier  Prep:DuraPrep  Monitoring:blood pressure monitoring  Epidural Block Procedure:  Approach:midline  Guidance:palpation technique  Location:L3-L4  Needle Type:Tuohy  Needle Gauge:17 G  Loss of Resistance Medium: saline  Loss of Resistance: 7cm  Cath Depth at skin:14 cm  Paresthesia: none  Aspiration:negative  Test Dose:negative  Number of Attempts: 1  Post Assessment:  Dressing:occlusive dressing applied and secured with tape  Pt Tolerance:patient tolerated the procedure well with no apparent complications  Complications:no

## 2024-12-18 NOTE — H&P
[facility]sorio  Obstetric History and Physical    CC: IOL    SUBJECTIVE:     Patient is a 29 y.o. female  currently at 39w3d, who presents with uncomplicated pregnancy for IOL.      Prenatal Information:  Prenatal Results       Initial Prenatal Labs       Test Value Reference Range Date Time    Hemoglobin  11.2 g/dL 12.0 - 15.9 07/10/24 0939    Hematocrit  33.5 % 34.0 - 46.6 07/10/24 0939    Platelets  171 10*3/mm3 140 - 450 07/10/24 0939    Rubella IgG  <0.90 index Immune >0.99 07/10/24 0939    Hepatitis B SAg  Non-Reactive  Non-Reactive 07/10/24 0939    Hepatitis C Ab  Non-Reactive  Non-Reactive 07/10/24 0939    RPR        T. Pallidum Ab   Non-Reactive  Non-Reactive 10/08/24 1721       Non-Reactive  Non-Reactive 07/10/24 0939    ABO  A   24 0634    Rh  Negative   24 0634    Antibody Screen  Positive   07/10/24 0939    HIV  Non-Reactive  Non-Reactive 07/10/24 0939    Urine Culture  >100,000 CFU/mL Escherichia coli   24 1208       >100,000 CFU/mL Normal Urogenital Guera   07/10/24 0939    Gonorrhea  Negative  Negative 07/10/24 0939    Chlamydia  Negative  Negative 07/10/24 0939    TSH        HgB A1c         Varicella IgG  790 index Immune >165 07/10/24 0939    Hemoglobinopathy Fractionation        Hemoglobinopathy (genetic testing)        Cystic fibrosis         Spinal muscular atrophy        Fragile X                  Fetal testing        Test Value Reference Range Date Time    NIPT        MSAFP        AFP-4                  2nd and 3rd Trimester       Test Value Reference Range Date Time    Hemoglobin (repeated)  12.7 g/dL 12.0 - 15.9 24 0634       10.8 g/dL 12.0 - 15.9 10/08/24 1721    Hematocrit (repeated)  37.1 % 34.0 - 46.6 24 0634       33.7 % 34.0 - 46.6 10/08/24 1721    Platelets   138 10*3/mm3 140 - 450 24 0634       145 10*3/mm3 140 - 450 10/08/24 1721       171 10*3/mm3 140 - 450 07/10/24 0939    1 hour GTT   86 mg/dL 65 - 139 10/08/24 1721    Antibody Screen  (repeated)  Positive   12/18/24 0634       Negative   10/08/24 1721    3rd TM syphilis scrn (repeated)  RPR         3rd TM syphilis scrn (repeated) TP-Ab  Non-Reactive  Non-Reactive 10/08/24 1721    3rd TM syphilis screen TB-Ab (FTA)  Non-Reactive  Non-Reactive 10/08/24 1721    Syphilis cascade test TP-Ab (EIA)        Syphilis cascade TPPA        GTT Fasting        GTT 1 Hr        GTT 2 Hr        GTT 3 Hr        Group B Strep ^ Negative   11/26/24               Other testing        Test Value Reference Range Date Time    Parvo IgG         CMV IgG                   Drug Screening       Test Value Reference Range Date Time    Amphetamine Screen  Negative  Negative 07/10/24 0939    Barbiturate Screen  Negative  Negative 07/10/24 0939    Benzodiazepine Screen  Negative  Negative 07/10/24 0939    Methadone Screen  Negative  Negative 07/10/24 0939    Phencyclidine Screen  Negative  Negative 07/10/24 0939    Opiates Screen  Negative  Negative 07/10/24 0939    THC Screen  Negative  Negative 07/10/24 0939    Cocaine Screen  Negative  Negative 07/10/24 0939    Propoxyphene Screen        Buprenorphine Screen  Negative  Negative 07/10/24 0939    Methamphetamine Screen  Negative  Negative 07/10/24 0939    Oxycodone Screen  Negative  Negative 07/10/24 0939    Tricyclic Antidepressants Screen  Negative  Negative 07/10/24 0939              Legend    ^: Historical                          External Prenatal Results       Pregnancy Outside Results - Transcribed From Office Records - See Scanned Records For Details       Test Value Date Time    ABO  A  12/18/24 0634    Rh  Negative  12/18/24 0634    Antibody Screen  Positive  12/18/24 0634       Negative  10/08/24 1721       Positive  07/10/24 0939    Varicella IgG  790 index 07/10/24 0939    Rubella  <0.90 index 07/10/24 0939    Hgb  12.7 g/dL 12/18/24 0634       10.8 g/dL 10/08/24 1721       11.2 g/dL 07/10/24 0939    Hct  37.1 % 12/18/24 0634       33.7 % 10/08/24 1721       33.5 %  07/10/24 0939    HgB A1c        1h GTT  86 mg/dL 10/08/24 1721    3h GTT Fasting       3h GTT 1 hour       3h GTT 2 hour       3h GTT 3 hour        Gonorrhea (discrete)  Negative  07/10/24 09    Chlamydia (discrete)  Negative  07/10/24 0939    RPR       Syphils cascade: TP-Ab (FTA)  Non-Reactive  10/08/24 1721       Non-Reactive  07/10/24 0939    TP-Ab  Non-Reactive  10/08/24 1721       Non-Reactive  07/10/24 0939    TP-Ab (EIA)       TPPA       HBsAg  Non-Reactive  07/10/24 0939    Herpes Simplex Virus PCR       Herpes Simplex VIrus Culture       HIV  Non-Reactive  07/10/24 09    Hep C RNA Quant PCR       Hep C Antibody  Non-Reactive  07/10/24 09    AFP       NIPT       Cystic Fibrosis (Juan)       Cystic Fibroisis        Spinal Muscular atrophy       Fragile X       Group B Strep ^ Negative  24     GBS Susceptibility to Clindamycin       GBS Susceptibility to Erythromycin       Fetal Fibronectin       Genetic Testing, Maternal Blood                 Drug Screening       Test Value Date Time    Urine Drug Screen       Amphetamine Screen  Negative  07/10/24 0939    Barbiturate Screen  Negative  07/10/24 09    Benzodiazepine Screen  Negative  07/10/24 0939    Methadone Screen  Negative  07/10/24 09    Phencyclidine Screen  Negative  07/10/24 0939    Opiates Screen  Negative  07/10/24 0939    THC Screen  Negative  07/10/24 09    Cocaine Screen       Propoxyphene Screen       Buprenorphine Screen  Negative  07/10/24 09    Methamphetamine Screen       Oxycodone Screen  Negative  07/10/24 09    Tricyclic Antidepressants Screen  Negative  07/10/24 0939              Legend    ^: Historical                             OB History:                     OB History    Para Term  AB Living   4 2 2 0 1 2   SAB IAB Ectopic Molar Multiple Live Births   1 0 0 0 0 2      # Outcome Date GA Lbr Del/2nd Weight Sex Type Anes PTL Lv   4 Current            3 Term 22 39w2d  3317 g (7 lb 5 oz) F  Vag-Spont None  MARLON      Complications: Precipitant delivery   2 SAB 2021 8w0d    SAB      1 Term 10/17/19 40w0d  3572 g (7 lb 14 oz) F Vag-Spont EPI  MARLON      Allergies:                        Allergies   Allergen Reactions    Penicillins       Past Medical History: History reviewed. No pertinent past medical history.   Past Surgical History Past Surgical History:   Procedure Laterality Date    UMBILICAL HERNIA REPAIR  2006      Family History: Family History   Problem Relation Age of Onset    Breast cancer Maternal Aunt       Social History:  reports that she has never smoked. She has never used smokeless tobacco.   reports no history of alcohol use.   reports no history of drug use.    General ROS: Pertinent items are noted in HPI, all other systems reviewed and negative   Medications: Prenatal 27-1, cetirizine, ketotifen, methylphenidate, and oxybutynin XL       Objective       Vital Signs Range for the last 24 hours  Temperature: Temp:  [97.5 °F (36.4 °C)-98.1 °F (36.7 °C)] 98.1 °F (36.7 °C)   BP: BP: (107-121)/(65-73) 107/65   Pulse: Heart Rate:  [83-91] 83   Respirations: Resp:  [16] 16   SPO2:                 Physical Examination: General appearance - alert, well appearing, and in no distress  Chest - clear to auscultation, no wheezes, rales or rhonchi, symmetric air entry  Heart - normal rate, regular rhythm, normal S1, S2, no murmurs, rubs, clicks or gallops  Abdomen - Soft, gravid, nontender  Extremities - pedal edema tr +      Presentation: VTX   Cervix: Exam by: Method: sterile vaginal exam performed   Dilation: Cervical Dilation (cm): 4   Effacement: Cervical Effacement: 60   Station: Fetal Station: 1-->-2   AROM clear fluid    Fetal Heart Rate Assessment           Baseline: Fetal HR Baseline: normal range   Variability: Fetal HR Variability: moderate (amplitude range 6 to 25 bpm)   Accels: Fetal HR Accelerations: greater than/equal to 15 bpm, lasting at least 15 seconds   Decels: Fetal HR  Decelerations: absent   Tracing Category:       Uterine Assessment   Method: Method: external tocotransducer   Frequency (min): Contraction Frequency (Minutes): poor tracing   Ctx Count in 10 min: Contractions in 10 Minutes: 1     Laboratory Results:  CBC:      Lab 12/18/24  0634   WBC 5.65   HEMOGLOBIN 12.7   HEMATOCRIT 37.1   PLATELETS 138*   MCV 89.2              Assessment/Plan:   IUP 39w3d for IOL      1.IOL  -PPP    2.GBS neg    3.FWB reasssuring      Marcelina Rosales MD  12/18/2024  08:54 EST

## 2024-12-18 NOTE — L&D DELIVERY NOTE
" [facility]   Vaginal Delivery Note    Patient Name: Richa Morris  : 1995  MRN: [id number]    Date of Delivery: 2024    Diagnosis     Pre & Post-Delivery:  Intrauterine pregnancy at 39w3d  Labor status:      Term pregnancy             Problem List    Transfer to Postpartum     Review the Delivery Report for details.     Delivery     Delivery: Vaginal, Spontaneous    YOB: 2022   Time of Birth:  Gestational Age 12:38 PM  39w3d     Anesthesia: Epidural    Delivering clinician:     Forceps?   No   Vacuum? No    Shoulder dystocia present: No        Delivery narrative:  The patient progressed to complete and delivered a VFI  with Apgars 8/9 the weight is  7#6 via  with epidural anesthesia. Shoulders were delivered easily. The cord was clamped and cut after 60 second delay and the infant was placed on the mother's chest for skin- to - skin. Cord blood was obtained and the placenta was delivered spontaneously intact. 30 units of IV Pitocin was started. Uterine tone was appropriate.   no laceration(s) noted  The patient tolerated the procedure well and remained in the LDR for recovery. All counts correct.        Infant     Findings: female infant     Infant observations: Weight: 7#6  Length:   in  Observations/Comments:        Apgars:  8 @ 1 minute /     9 @ 5 minutes   Infant Name:      Placenta & Cord         Placenta delivered   Spontaneously intact at   2024 12:43 PM    Cord:   present.   Nuchal Cord?  no   Cord blood obtained:  yes   Cord gases obtained:   no   Cord gas results: Venous:  No results found for: \"PHCVEN\", \"BECVEN\"    Arterial:  No results found for: \"PHCART\", \"BECART\"     Repair     Episiotomy: Not recorded    No    Lacerations: No   Estimated Blood Loss:       Quantitative Blood Loss:  75mL        Complications     none    Disposition     Mother to Mother Baby/Postpartum  in stable condition currently.  Baby to remains with mom  in stable condition " currently.    Marcelina Rosales MD  12/18/24  12:51 EST

## 2024-12-18 NOTE — LACTATION NOTE
12/18/24 4790   Maternal Information   Date of Referral 12/18/24   Person Making Referral lactation consultant   Maternal Reason for Referral   (Courtesy visit for new delivery. Hx: only BF 1& 2 in while in hospital. Pt unsure why she didn't make milk. Educ on freq putting infant to BR q2-3hrs. Pt to pump if consistently sleeping past 3hrs, giving short feeds or if supplement is given.)   Maternal Assessment   Breast Shape Bilateral:;round   Breast Density Bilateral:;soft   Nipples Bilateral:;everted   Left Nipple Symptoms intact;nontender   Right Nipple Symptoms intact;nontender   Maternal Infant Feeding   Maternal Emotional State receptive   Infant Positioning   (attempted in LFB unable to stay latched. Repositioned to RCC & infant doing very well w/deep excursions noted.)   Signs of Milk Transfer deep jaw excursions noted  (Lots of colostrum noted w/ hand expression)   Pain with Feeding no   Comfort Measures Before/During Feeding suction broken using finger;maternal position adjusted;latch adjusted;infant position adjusted   Latch Assistance full assistance needed  (mother took over feeding after demonstration given)   Support Person Involvement   (Just arrived at bedside)   Milk Expression/Equipment   Breast Pump Type double electric, personal  (Pt has a Medela pump for home use)        Partial Purse String (Simple) Text: Given the location of the defect and the characteristics of the surrounding skin a simple purse string closure was deemed most appropriate.  Undermining was performed circumfirentially around the surgical defect.  A purse string suture was then placed and tightened. Wound tension only allowed a partial closure of the circular defect.

## 2024-12-19 LAB
BASOPHILS # BLD AUTO: 0.01 10*3/MM3 (ref 0–0.2)
BASOPHILS NFR BLD AUTO: 0.1 % (ref 0–1.5)
DEPRECATED RDW RBC AUTO: 48.9 FL (ref 37–54)
EOSINOPHIL # BLD AUTO: 0.09 10*3/MM3 (ref 0–0.4)
EOSINOPHIL NFR BLD AUTO: 1.2 % (ref 0.3–6.2)
ERYTHROCYTE [DISTWIDTH] IN BLOOD BY AUTOMATED COUNT: 14.8 % (ref 12.3–15.4)
HCT VFR BLD AUTO: 31.5 % (ref 34–46.6)
HGB BLD-MCNC: 10.7 G/DL (ref 12–15.9)
IMM GRANULOCYTES # BLD AUTO: 0.04 10*3/MM3 (ref 0–0.05)
IMM GRANULOCYTES NFR BLD AUTO: 0.5 % (ref 0–0.5)
LYMPHOCYTES # BLD AUTO: 0.81 10*3/MM3 (ref 0.7–3.1)
LYMPHOCYTES NFR BLD AUTO: 10.7 % (ref 19.6–45.3)
MCH RBC QN AUTO: 30.7 PG (ref 26.6–33)
MCHC RBC AUTO-ENTMCNC: 34 G/DL (ref 31.5–35.7)
MCV RBC AUTO: 90.3 FL (ref 79–97)
MONOCYTES # BLD AUTO: 0.89 10*3/MM3 (ref 0.1–0.9)
MONOCYTES NFR BLD AUTO: 11.8 % (ref 5–12)
NEUTROPHILS NFR BLD AUTO: 5.7 10*3/MM3 (ref 1.7–7)
NEUTROPHILS NFR BLD AUTO: 75.7 % (ref 42.7–76)
NRBC BLD AUTO-RTO: 0 /100 WBC (ref 0–0.2)
PLATELET # BLD AUTO: 124 10*3/MM3 (ref 140–450)
PMV BLD AUTO: 10.8 FL (ref 6–12)
RBC # BLD AUTO: 3.49 10*6/MM3 (ref 3.77–5.28)
WBC NRBC COR # BLD AUTO: 7.54 10*3/MM3 (ref 3.4–10.8)

## 2024-12-19 PROCEDURE — 85025 COMPLETE CBC W/AUTO DIFF WBC: CPT | Performed by: OBSTETRICS & GYNECOLOGY

## 2024-12-19 RX ORDER — PSEUDOEPHEDRINE HCL 30 MG
100 TABLET ORAL 2 TIMES DAILY
Qty: 60 CAPSULE | Refills: 0 | Status: SHIPPED | OUTPATIENT
Start: 2024-12-19

## 2024-12-19 RX ORDER — IBUPROFEN 600 MG/1
600 TABLET, FILM COATED ORAL EVERY 6 HOURS PRN
Qty: 60 TABLET | Refills: 1 | Status: SHIPPED | OUTPATIENT
Start: 2024-12-19

## 2024-12-19 RX ADMIN — IBUPROFEN 600 MG: 600 TABLET, FILM COATED ORAL at 09:23

## 2024-12-19 RX ADMIN — PRENATAL VITAMINS-IRON FUMARATE 27 MG IRON-FOLIC ACID 0.8 MG TABLET 1 TABLET: at 09:22

## 2024-12-19 RX ADMIN — DOCUSATE SODIUM 100 MG: 100 CAPSULE, LIQUID FILLED ORAL at 09:22

## 2024-12-19 NOTE — DISCHARGE SUMMARY
[facility]  Discharge Summary      Patient: Richa Morris      MR#:[id number]      Service: Obstetrics    Date of Admission: 12/18/2024  Date of Discharge:  12/19/2024    Admission  Diagnosis:   Problems Addressed this Visit    None  Diagnoses    None.       Discharge Diagnosis: No diagnosis found.      Presenting Problem/History of Present Illness  Term pregnancy [Z34.90]         Hospital Course  Patient is a 29 y.o. female presented for IOL.  Patient underwent a vaginal delivery and remained in the hospital for 1 days. During that time she remained afebrile and hemodynamically stable. On the day of discharge, pain was well controlled, ambulating, tolerating PO intake, voiding without difficulty. Female infant doing well. Desires discharge home on PPD#1.        Procedures Performed         Consults:   Consults       No orders found for last 30 day(s).            Pertinent Test Results:     Labs:  Lab Results   Component Value Date    WBC 7.54 12/19/2024    HGB 10.7 (L) 12/19/2024    HCT 31.5 (L) 12/19/2024    MCV 90.3 12/19/2024     (L) 12/19/2024     Results from last 7 days   Lab Units 12/18/24  0634   ABO TYPING  A   RH TYPING  Negative   ANTIBODY SCREEN  Positive       Discharge Disposition:  To Home    Condition on Discharge:  Stable    Vital Signs  Temp:  [97.5 °F (36.4 °C)-98.2 °F (36.8 °C)] 98.2 °F (36.8 °C)  Heart Rate:  [73-96] 78  Resp:  [16-18] 16  BP: (100-134)/(58-90) 110/75    Physical Examination    Constitutional: A&Ox3. No apparent distress. Doing well.  HEENT: Normocephalic, atraumatic.  Neurological: Negative for sensory or motor deficit  Cardiovascular: RRR; negative for murmur, rubs, gallops  Respiratory: Clear to auscultation bilaterally; negative for rales, crackles or wheezes  Abdominal: nontender, soft, negative for guarding, rebound  Extremities: negative for edema and tenderness, negative Estefanía's  Skin: normal turgor; negative for rash or lesions  Psychiatric: normal  affect, normal thought process, good insight, good judgment  Fundus: Firm, below umbilicus, midline    Discharge Disposition  Home or Self Care Home    Discharge Medications     Discharge Medications        New Medications        Instructions Start Date   docusate sodium 100 MG capsule   100 mg, Oral, 2 Times Daily      ibuprofen 600 MG tablet  Commonly known as: ADVIL,MOTRIN   600 mg, Oral, Every 6 Hours PRN             Continue These Medications        Instructions Start Date   cetirizine 10 MG tablet  Commonly known as: zyrTEC   No dose, route, or frequency recorded.      [employer] Allergy Eye Drops 0.025 % ophthalmic solution  Generic drug: ketotifen   No dose, route, or frequency recorded.      Prenatal 27-1 27-1 MG tablet tablet   1 tablet, Daily             Stop These Medications      methylphenidate 10 MG tablet  Commonly known as: RITALIN     oxybutynin XL 10 MG 24 hr tablet  Commonly known as: DITROPAN-XL              Discharge Diet:  Regular    Activity at Discharge:  Pelvic rest    Postop Instructions:  Follow up in office at [facility] in 6 weeks for postpartum evaluation. Contact office if complications arise before that time. Discussed symptoms of fevers, chills, abdominal pain, increased bleeding. Reviewed bleeding soaking through a pad every 1-2 hours, passage of blood clots larger than the size of her palm, signs of infection including fevers or chills, abrupt increases in pain. If symptoms occur contact office or if severe present to emergency department. Also discussed postpartum blues, depression, SI/HI. N  Nothing vaginally for the next 6 weeks including sexual intercourse, tampons, douches, soaking in a bathtub or hot tub. Resume home medications.    Follow-up Appointments  [facility] in 6 weeks    Test Results Pending at Discharge    None     Zabrina Hardwick DO  12/19/24  09:02 EST    Time: Discharge 20 min

## 2024-12-19 NOTE — LACTATION NOTE
12/19/24 1100   Maternal Information   Date of Referral 12/19/24   Person Making Referral lactation consultant  (courtesy)   Maternal Reason for Referral other (see comments)  (mother sleeping at time of visit; told FOB would check in later this morning)

## 2024-12-19 NOTE — LACTATION NOTE
12/19/24 1459   Maternal Information   Date of Referral 12/19/24   Person Making Referral nurse  (mother is formula feeding infant; to call lactation if needs)

## 2024-12-19 NOTE — ANESTHESIA POSTPROCEDURE EVALUATION
Patient: Richa Morris    Procedure Summary       Date: 12/18/24 Room / Location:     Anesthesia Start: 1119 Anesthesia Stop: 1243    Procedure: LABOR ANALGESIA Diagnosis:     Scheduled Providers:  Provider: Margo Ortiz DO    Anesthesia Type: epidural ASA Status: 2            Anesthesia Type: epidural    Vitals  Vitals Value Taken Time   /75 12/19/24 0755   Temp 98.2 °F (36.8 °C) 12/19/24 0755   Pulse 78 12/19/24 0755   Resp 16 12/19/24 0755   SpO2             Post Anesthesia Care and Evaluation    Patient location during evaluation: bedside  Patient participation: complete - patient participated  Level of consciousness: awake and alert  Pain management: adequate    Airway patency: patent  Anesthetic complications: No anesthetic complications    Cardiovascular status: acceptable  Respiratory status: acceptable  Hydration status: acceptable  Post Neuraxial Block status: Motor and sensory function returned to baseline and No signs or symptoms of PDPH

## 2024-12-20 VITALS
TEMPERATURE: 98.9 F | RESPIRATION RATE: 16 BRPM | DIASTOLIC BLOOD PRESSURE: 66 MMHG | HEART RATE: 87 BPM | WEIGHT: 168 LBS | SYSTOLIC BLOOD PRESSURE: 108 MMHG | BODY MASS INDEX: 31.72 KG/M2 | HEIGHT: 61 IN

## 2024-12-20 RX ADMIN — DOCUSATE SODIUM 100 MG: 100 CAPSULE, LIQUID FILLED ORAL at 09:36

## 2024-12-20 RX ADMIN — PRENATAL VITAMINS-IRON FUMARATE 27 MG IRON-FOLIC ACID 0.8 MG TABLET 1 TABLET: at 09:37

## 2024-12-20 NOTE — DISCHARGE SUMMARY
[facility]  Discharge Summary      Patient: Richa Morris      MR#:[id number]      Service: Obstetrics    Date of Admission: 12/18/2024  Date of Discharge:  12/20/2024    Admission  Diagnosis:   Problems Addressed this Visit    None  Diagnoses    None.       Discharge Diagnosis: No diagnosis found.      Presenting Problem/History of Present Illness  Term pregnancy [Z34.90]         Hospital Course  Patient is a 29 y.o. female presented for IOL.  Patient underwent a vaginal delivery and remained in the hospital for 2 days. During that time she remained afebrile and hemodynamically stable. On the day of discharge, pain was well controlled, ambulating, tolerating PO intake, voiding without difficulty. Female infant doing well. Desires discharge home on PPD#2.        Procedures Performed         Consults:   Consults       No orders found from 11/19/2024 to 12/19/2024.            Pertinent Test Results:     Labs:  Lab Results   Component Value Date    WBC 7.54 12/19/2024    HGB 10.7 (L) 12/19/2024    HCT 31.5 (L) 12/19/2024    MCV 90.3 12/19/2024     (L) 12/19/2024     Results from last 7 days   Lab Units 12/18/24  0634   ABO TYPING  A   RH TYPING  Negative   ANTIBODY SCREEN  Positive       Discharge Disposition:  To Home    Condition on Discharge:  Stable    Vital Signs  Temp:  [98.1 °F (36.7 °C)-98.2 °F (36.8 °C)] 98.2 °F (36.8 °C)  Heart Rate:  [80] 80  Resp:  [16-18] 18  BP: (120-127)/(68-76) 127/68    Physical Examination    Constitutional: A&Ox3. No apparent distress. Doing well.  HEENT: Normocephalic, atraumatic.  Neurological: Negative for sensory or motor deficit  Cardiovascular: RRR; negative for murmur, rubs, gallops  Respiratory: Clear to auscultation bilaterally; negative for rales, crackles or wheezes  Abdominal: nontender, soft, negative for guarding, rebound  Extremities: negative for edema and tenderness, negative Estefanía's  Skin: normal turgor; negative for rash or lesions  Psychiatric:  normal affect, normal thought process, good insight, good judgment  Fundus: Firm, below umbilicus, midline    Discharge Disposition  Home or Self Care Home    Discharge Medications     Discharge Medications        New Medications        Instructions Start Date   ibuprofen 600 MG tablet  Commonly known as: ADVIL,MOTRIN   Take 1 tablet by mouth Every 6 (Six) Hours As Needed for Mild Pain      Stool Softener 100 MG capsule  Generic drug: docusate sodium   100 mg, Oral, 2 Times Daily             Continue These Medications        Instructions Start Date   cetirizine 10 MG tablet  Commonly known as: zyrTEC   No dose, route, or frequency recorded.      [employer] Allergy Eye Drops 0.025 % ophthalmic solution  Generic drug: ketotifen   No dose, route, or frequency recorded.      Prenatal 27-1 27-1 MG tablet tablet   1 tablet, Daily             Stop These Medications      methylphenidate 10 MG tablet  Commonly known as: RITALIN     oxybutynin XL 10 MG 24 hr tablet  Commonly known as: DITROPAN-XL              Discharge Diet:  Regular    Activity at Discharge:  Pelvic rest    Postop Instructions:  Follow up in office at [facility] in 6 weeks for postpartum evaluation. Contact office if complications arise before that time. Discussed symptoms of fevers, chills, abdominal pain, increased bleeding. Reviewed bleeding soaking through a pad every 1-2 hours, passage of blood clots larger than the size of her palm, signs of infection including fevers or chills, abrupt increases in pain. If symptoms occur contact office or if severe present to emergency department. Also discussed postpartum blues, depression, SI/HI. N  Nothing vaginally for the next 6 weeks including sexual intercourse, tampons, douches, soaking in a bathtub or hot tub. Resume home medications.    Follow-up Appointments  [facility] in 6 weeks    Test Results Pending at Discharge    None     Zabrina Hardwick DO  12/20/24  07:57 EST    Time: Discharge 20 min